# Patient Record
Sex: MALE | Race: WHITE
[De-identification: names, ages, dates, MRNs, and addresses within clinical notes are randomized per-mention and may not be internally consistent; named-entity substitution may affect disease eponyms.]

---

## 2023-10-26 ENCOUNTER — HOSPITAL ENCOUNTER (EMERGENCY)
Dept: HOSPITAL 76 - ED | Age: 33
Discharge: HOME | End: 2023-10-26
Payer: COMMERCIAL

## 2023-10-26 ENCOUNTER — HOSPITAL ENCOUNTER (OUTPATIENT)
Dept: HOSPITAL 76 - EMS | Age: 33
Discharge: ANOTHER HOSPITAL | End: 2023-10-26
Payer: COMMERCIAL

## 2023-10-26 VITALS — DIASTOLIC BLOOD PRESSURE: 102 MMHG | SYSTOLIC BLOOD PRESSURE: 172 MMHG | OXYGEN SATURATION: 99 %
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
## 2023-10-26 DIAGNOSIS — L50.0: ICD-10-CM

## 2023-10-26 DIAGNOSIS — S90.562A: Primary | ICD-10-CM

## 2023-10-26 DIAGNOSIS — R06.00: ICD-10-CM

## 2023-10-26 PROCEDURE — 99283 EMERGENCY DEPT VISIT LOW MDM: CPT

## 2023-10-26 PROCEDURE — 96374 THER/PROPH/DIAG INJ IV PUSH: CPT

## 2023-10-26 NOTE — ED PHYSICIAN DOCUMENTATION
History of Present Illness





- Stated complaint


Stated Complaint: ALLERGIC REACTION





- History obtained from


History obtained from: Patient





- Additonal information


Additional information: 


Patient is a 33-year-old male presenting for evaluation of an allergic reaction.

 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 

something bite or sting him in the back of the left ankle around 2 or 230.  

Shortly thereafter he reports feeling hives, itchiness, warmth, tightness in his

chest and throat.  EMS was called and patient was administered IM epinephrine 

along with Benadryl.  Patient denies prior history of similar symptoms.  States 

he is starting to feel better.








Review of Systems


Constitutional: denies: Fever


Cardiac: denies: Chest pain / pressure


Respiratory: denies: Dyspnea


GI: denies: Abdominal Pain


Skin: reports: Rash





PD PAST MEDICAL HISTORY





- Present Medications


Home Medications: 


                                Ambulatory Orders











 Medication  Instructions  Recorded  Confirmed


 


EPINEPHrine [Epinephrine] 0.3 mg IJ ONCE PRN #2 each 10/26/23 


 


diphenhydrAMINE [Benadryl] 25 mg PO Q6HR PRN #20 cap 10/26/23 














- Allergies


Allergies/Adverse Reactions: 


                                    Allergies











Allergy/AdvReac Type Severity Reaction Status Date / Time


 


No Known Drug Allergies Allergy   Verified 10/26/23 16:07














PD ED PE NORMAL





- General


General: Alert and oriented X 3, No acute distress, Well developed/nourished





- HEENT


HEENT: Atraumatic, Moist mucous membranes, Pharynx benign (No oral swelling, 

normal speech)





- Neck


Neck: Supple, no meningeal sign





- Cardiac


Cardiac: RRR, No murmur





- Respiratory


Respiratory: No respiratory distress, Clear bilaterally





- Derm


Derm: Other





- Neuro


Neuro: Normal speech





Results





- Vitals


Vitals: 


                               Vital Signs - 24 hr











  10/26/23 10/26/23 10/26/23





  16:03 16:46 17:42


 


Temperature 36 C L  


 


Heart Rate 80 75 77


 


Respiratory 15 13 25 H





Rate   


 


Blood Pressure 189/122 H 175/82 H 172/102 H


 


O2 Saturation 99 100 99








                                     Oxygen











O2 Source                      Room air

















PD Medical Decision Making





- ED course


Complexity details: re-evaluated patient, d/w patient


ED course: 


Pt with throat tightening and hives after being stung by insect on ankle. Given 

IM epi and benadryl by EMS. Improved on arrival here with no signs of airway 

compromise or continued anaphylaxis. Pt given decadron and IV fluids and 

monitored for several hours with resolution of all symptoms other than small 

hives near ankle. No signs of rebound symptoms. Pt counseled on need to carry IM

 epi as well as concerning symptoms to return for.








Departure





- Departure


Disposition: 01 Home, Self Care


Clinical Impression: 


 Anaphylactic reaction





Condition: Stable


Instructions:  ED Anaphylaxis General


Prescriptions: 


diphenhydrAMINE [Benadryl] 25 mg PO Q6HR PRN #20 cap


 PRN Reason: Itching


EPINEPHrine [Epinephrine] 0.3 mg IJ ONCE PRN #2 each


 PRN Reason: Allergy Symptoms


Comments: 


I believe today your symptoms were from an anaphylactic reaction.  You were 

given a dose of epinephrine by EMS.  We have given you a long-acting steroid 

called Decadron and you have also received Benadryl to help with the itching.  

Fortunately her symptoms are improving.  I have sent prescriptions for an EpiPen

 as well as Benadryl to [facility] in [place].  Please make sure to carry the 

EpiPen with you at all times.  I would also recommend close follow-up with your 

primary care provider.  If at anytime you feel any worsening symptoms such as 

throat tightness, swelling around your face, trouble breathing or any concerns 

please Use your EpiPen and dial 911.


Forms:  Activity restrictions


Discharge Date/Time: 10/26/23 17:46

## 2024-09-09 ENCOUNTER — HOSPITAL ENCOUNTER (OUTPATIENT)
Dept: HOSPITAL 76 - LAB.N | Age: 34
Discharge: HOME | End: 2024-09-09
Payer: COMMERCIAL

## 2024-09-09 DIAGNOSIS — I10: Primary | ICD-10-CM

## 2024-09-09 LAB
ALBUMIN DIAFP-MCNC: 4.4 G/DL (ref 3.2–5.5)
ALBUMIN/GLOB SERPL: 1.8 {RATIO} (ref 1–2.2)
ALP SERPL-CCNC: 57 IU/L (ref 42–121)
ALT SERPL W P-5'-P-CCNC: 48 IU/L (ref 10–60)
ANION GAP SERPL CALCULATED.4IONS-SCNC: 7 MMOL/L (ref 6–13)
AST SERPL W P-5'-P-CCNC: 25 IU/L (ref 10–42)
BASOPHILS NFR BLD AUTO: 0.1 10^3/UL (ref 0–0.1)
BASOPHILS NFR BLD AUTO: 0.9 %
BILIRUB BLD-MCNC: 0.4 MG/DL (ref 0.2–1)
BUN SERPL-MCNC: 13 MG/DL (ref 6–20)
CALCIUM UR-MCNC: 9.4 MG/DL (ref 8.5–10.3)
CHLORIDE SERPL-SCNC: 102 MMOL/L (ref 101–111)
CO2 SERPL-SCNC: 29 MMOL/L (ref 21–32)
CREAT SERPLBLD-SCNC: 1 MG/DL (ref 0.6–1.3)
EOSINOPHIL # BLD AUTO: 0.3 10^3/UL (ref 0–0.7)
EOSINOPHIL NFR BLD AUTO: 4.6 %
ERYTHROCYTE [DISTWIDTH] IN BLOOD BY AUTOMATED COUNT: 12.1 % (ref 12–15)
GFRSERPLBLD MDRD-ARVRAT: 85 ML/MIN/{1.73_M2} (ref 89–?)
GLOBULIN SER-MCNC: 2.4 G/DL (ref 2.1–4.2)
GLUCOSE SERPL-MCNC: 111 MG/DL (ref 74–104)
HCT VFR BLD AUTO: 48.5 % (ref 42–52)
HGB UR QL STRIP: 17.1 G/DL (ref 14–18)
LYMPHOCYTES # SPEC AUTO: 2.6 10^3/UL (ref 1.5–3.5)
LYMPHOCYTES NFR BLD AUTO: 39.4 %
MCH RBC QN AUTO: 31.4 PG (ref 27–31)
MCHC RBC AUTO-ENTMCNC: 35.3 G/DL (ref 32–36)
MCV RBC AUTO: 89 FL (ref 80–94)
MONOCYTES # BLD AUTO: 0.4 10^3/UL (ref 0–1)
MONOCYTES NFR BLD AUTO: 6.4 %
NEUTROPHILS # BLD AUTO: 3.2 10^3/UL (ref 1.5–6.6)
NEUTROPHILS # SNV AUTO: 6.7 X10^3/UL (ref 4.8–10.8)
NEUTROPHILS NFR BLD AUTO: 48.4 %
NRBC # BLD AUTO: 0 /100WBC
NRBC # BLD AUTO: 0 X10^3/UL
PDW BLD AUTO: 10.4 FL (ref 7.4–11.4)
PLATELET # BLD: 210 10^3/UL (ref 130–450)
POTASSIUM SERPL-SCNC: 3.9 MMOL/L (ref 3.5–4.5)
PROT SPEC-MCNC: 6.8 G/DL (ref 6.4–8.9)
RBC MAR: 5.45 10^6/UL (ref 4.7–6.1)
SODIUM SERPLBLD-SCNC: 138 MMOL/L (ref 135–145)

## 2024-09-09 PROCEDURE — 85025 COMPLETE CBC W/AUTO DIFF WBC: CPT

## 2024-09-09 PROCEDURE — 36415 COLL VENOUS BLD VENIPUNCTURE: CPT

## 2024-09-09 PROCEDURE — 80053 COMPREHEN METABOLIC PANEL: CPT
